# Patient Record
Sex: FEMALE | URBAN - METROPOLITAN AREA
[De-identification: names, ages, dates, MRNs, and addresses within clinical notes are randomized per-mention and may not be internally consistent; named-entity substitution may affect disease eponyms.]

---

## 2024-01-24 ENCOUNTER — TELEPHONE (OUTPATIENT)
Dept: PEDIATRICS CLINIC | Facility: CLINIC | Age: 24
End: 2024-01-24

## 2024-01-26 NOTE — TELEPHONE ENCOUNTER
01/25/24 11:13 PM    The office's request has been received, reviewed, and noted that it no longer requires attention; duplicate request. This message will now be completed.    Batsheva Pompa   English